# Patient Record
(demographics unavailable — no encounter records)

---

## 2024-12-19 NOTE — PHYSICAL EXAM
[General Appearance - Alert] : alert [General Appearance - In No Acute Distress] : in no acute distress [General Appearance - Well Nourished] : well nourished [General Appearance - Well Developed] : well developed [General Appearance - Well-Appearing] : well appearing [Appearance Of Head] : the head was normocephalic [Facies] : there were no dysmorphic facial features [Sclera] : the conjunctiva were normal [Outer Ear] : the ears and nose were normal in appearance [Examination Of The Oral Cavity] : mucous membranes were moist and pink [Auscultation Breath Sounds / Voice Sounds] : breath sounds clear to auscultation bilaterally [Normal Chest Appearance] : the chest was normal in appearance [Apical Impulse] : quiet precordium with normal apical impulse [Heart Rate And Rhythm] : normal heart rate and rhythm [Heart Sounds] : normal S1 and S2 [Heart Sounds Gallop] : no gallops [Heart Sounds Pericardial Friction Rub] : no pericardial rub [Edema] : no edema [Arterial Pulses] : normal upper and lower extremity pulses with no pulse delay [Heart Sounds Click] : no clicks [Capillary Refill Test] : normal capillary refill [Bowel Sounds] : normal bowel sounds [Abdomen Soft] : soft [Nondistended] : nondistended [Abdomen Tenderness] : non-tender [Nail Clubbing] : no clubbing  or cyanosis of the fingers [Motor Tone] : normal muscle strength and tone [Cervical Lymph Nodes Enlarged Anterior] : The anterior cervical nodes were normal [Cervical Lymph Nodes Enlarged Posterior] : The posterior cervical nodes were normal [Skin Lesions] : no lesions [Skin Turgor] : normal turgor [Demonstrated Behavior - Infant Nonreactive To Parents] : interactive [Mood] : mood and affect were appropriate for age [Demonstrated Behavior] : normal behavior [Systolic] : systolic [II] : a grade 2/6 [LMSB] : LMSB  [Low] : low pitched [Vibratory] : vibratory [Mid] : mid [] : increases when lying on left side Global muscle tone and symmetry mild decreased tone/Global muscle tone and symmetry

## 2024-12-19 NOTE — PHYSICAL EXAM
[General Appearance - Alert] : alert [General Appearance - In No Acute Distress] : in no acute distress [General Appearance - Well Nourished] : well nourished [General Appearance - Well Developed] : well developed [General Appearance - Well-Appearing] : well appearing [Facies] : there were no dysmorphic facial features [Appearance Of Head] : the head was normocephalic [Sclera] : the conjunctiva were normal [Outer Ear] : the ears and nose were normal in appearance [Examination Of The Oral Cavity] : mucous membranes were moist and pink [Auscultation Breath Sounds / Voice Sounds] : breath sounds clear to auscultation bilaterally [Normal Chest Appearance] : the chest was normal in appearance [Apical Impulse] : quiet precordium with normal apical impulse [Heart Rate And Rhythm] : normal heart rate and rhythm [Heart Sounds] : normal S1 and S2 [Heart Sounds Gallop] : no gallops [Heart Sounds Pericardial Friction Rub] : no pericardial rub [Edema] : no edema [Arterial Pulses] : normal upper and lower extremity pulses with no pulse delay [Heart Sounds Click] : no clicks [Capillary Refill Test] : normal capillary refill [Bowel Sounds] : normal bowel sounds [Abdomen Soft] : soft [Nondistended] : nondistended [Abdomen Tenderness] : non-tender [Nail Clubbing] : no clubbing  or cyanosis of the fingers [Motor Tone] : normal muscle strength and tone [Cervical Lymph Nodes Enlarged Anterior] : The anterior cervical nodes were normal [Cervical Lymph Nodes Enlarged Posterior] : The posterior cervical nodes were normal [Skin Lesions] : no lesions [Skin Turgor] : normal turgor [Demonstrated Behavior - Infant Nonreactive To Parents] : interactive [Mood] : mood and affect were appropriate for age [Demonstrated Behavior] : normal behavior [Systolic] : systolic [II] : a grade 2/6 [LMSB] : LMSB  [Low] : low pitched [Vibratory] : vibratory [Mid] : mid [] : increases when lying on left side

## 2024-12-29 NOTE — HISTORY OF PRESENT ILLNESS
[FreeTextEntry1] : DELROY is a 13-year-old male who was referred for cardiology consultation due to palpitations. The palpitations began 2 weeks ago, and since then have been occurring 2 times. He was seen at [facility] ER on the day of second episode while in class on 12/6/2024.  The episodes start suddenly/abruptly, and typically occur while at rest, never during exercise. They last approximately 5-10 minutes and terminate spontaneously. They feel like a fast heart rate, as if running. They have not felt too fast to count. They are not associated with chest pain, shortness of breath, diaphoresis, lightheadedness, or nausea. DELROY has never had syncope. There has been no recent change in activity level, no fatigue, and no difficulty gaining weight or weight loss. He is active after school plays soccer and has had no recent decrease in exercise endurance. He generally has good fluid intake and does not drink excessive caffeinated beverages.  Importantly, there is no family history of premature sudden death, cardiomyopathy, arrhythmia, drowning, or unexplained accidental deaths.

## 2024-12-29 NOTE — HISTORY OF PRESENT ILLNESS
[FreeTextEntry1] : DELROY is a 13-year-old male who was referred for cardiology consultation due to palpitations. The palpitations began 2 weeks ago, and since then have been occurring 2 times. He was seen at [facility] on the day of second episode while in class on 12/6/2024.  The episodes start suddenly/abruptly, and typically occur while at rest, never during exercise. They last approximately 5-10 minutes and terminate spontaneously. They feel like a fast heart rate, as if running. They have not felt too fast to count. They are not associated with chest pain, shortness of breath, diaphoresis, lightheadedness, or nausea. DELROY has never had syncope. There has been no recent change in activity level, no fatigue, and no difficulty gaining weight or weight loss. He is active after school plays soccer and has had no recent decrease in exercise endurance. He generally has good fluid intake and does not drink excessive caffeinated beverages.  Importantly, there is no family history of premature sudden death, cardiomyopathy, arrhythmia, drowning, or unexplained accidental deaths.

## 2024-12-29 NOTE — REASON FOR VISIT
[Initial Evaluation] : an initial evaluation of [Dizziness/Lightheadedness] : dizziness/lightheadedness [Palpitations] : palpitations [Family Member] : family member [[employer] ] : provided by [employer]   [Interpreters_IDNumber] : [id number] [Interpreters_FullName] : Laure Cho

## 2024-12-29 NOTE — DISCUSSION/SUMMARY
[PE + No Restrictions] : [unfilled] may participate in the entire physical education program without restriction, including all varsity competitive sports. [FreeTextEntry1] : In summary, DELROY is a 13-year male with palpitations. I discussed at length with the family that these symptoms are concerning for a possible arrhythmia, and that I would like to investigate further with a Holter monitor (which was placed today). If He feels recurrent symptoms, His heart rate should be checked. Medical attention should be sought immediately if the palpitations are prolonged, associated with lightheadedness, or if there is loss of consciousness.    He may be feeling sinus tachycardia related to inadequate fluid intake, physical activity or anxiety. He should maintain good hydration. He should drink at least 6-8 cups of non-caffeinated beverages per day. Caffeinated beverages should be avoided. Fluid intake should be titrated to keep the urine dilute.   He has trivial aortic regurgitation, that needs to be monitored.  Routine pediatric cardiology follow-up is in 1 year, earlier if the Holter monitor is abnormal, if there are increased palpitations, syncope or any other cardiac concerns.  The family verbalized understanding, and all questions were answered.   Total time spent today included reviewing diagnosis and treatment plan/monitoring, review of prior notes, review of medications and monitoring for side effects, review of last labs with patient/family, plan for continued symptom and laboratory monitoring as ordered, and time spent with patient/parent. Reviewed ECG results, echocardiographic findings, prognosis and follow up plan as detailed in cardiology test results and discussion above. Reviewed recent chart notes from other providers and medical records as well. This excludes time spent on procedures. [Needs SBE Prophylaxis] : [unfilled] does not need bacterial endocarditis prophylaxis

## 2024-12-29 NOTE — CONSULT LETTER
[Today's Date] : [unfilled] [Name] : Name: [unfilled] [] : : ~~ [Today's Date:] : [unfilled] [Dear  ___:] : Dear Dr. [unfilled]: [Consult] : I had the pleasure of evaluating your patient, [unfilled]. My full evaluation follows. [Consult - Single Provider] : Thank you very much for allowing me to participate in the care of this patient. If you have any questions, please do not hesitate to contact me. [Sincerely,] : Sincerely, [FreeTextEntry5] : [address] [FreeTextEntry4] : Henry Suazo MD [FreeTextEntry6] : [place] [address] [de-identified] : Colby Cook MD, FACC, MAHESH, FAAP Pediatric Cardiologist [facility]

## 2024-12-29 NOTE — CARDIOLOGY SUMMARY
[Today's Date] : [unfilled] [FreeTextEntry1] : For palpitations, Normal sinus rhythm, normal QRS axis, normal intervals (QTc 415 msec), no hypertrophy, no pre-excitation, no ST segment or T wave abnormalities. Normal EKG for age.  [FreeTextEntry2] : Trivial aortic regurgitation.  LV dimensions and shortening fraction were normal.  No pericardial effusion. [de-identified] : placed [de-identified] : 11/7/2024 [de-identified] : Normal CBC, CMP and TC

## 2024-12-29 NOTE — CARDIOLOGY SUMMARY
[Today's Date] : [unfilled] [FreeTextEntry1] : For palpitations, Normal sinus rhythm, normal QRS axis, normal intervals (QTc 415 msec), no hypertrophy, no pre-excitation, no ST segment or T wave abnormalities. Normal EKG for age.  [FreeTextEntry2] : Trivial aortic regurgitation.  LV dimensions and shortening fraction were normal.  No pericardial effusion. [de-identified] : placed [de-identified] : 11/7/2024 [de-identified] : Normal CBC, CMP and TC

## 2024-12-29 NOTE — HISTORY OF PRESENT ILLNESS
[FreeTextEntry1] : DELROY is a 13-year-old male who was referred for cardiology consultation due to palpitations. The palpitations began 2 weeks ago, and since then have been occurring 2 times. He was seen at [facility] ER on the day of second episode while in class on 12/6/2024.  The episodes start suddenly/abruptly, and typically occur while at rest, never during exercise. They last approximately 5-10 minutes and terminate spontaneously. They feel like a fast heart rate, as if running. They have not felt too fast to count. They are not associated with chest pain, shortness of breath, diaphoresis, lightheadedness, or nausea. DELROY has never had syncope. There has been no recent change in activity level, no fatigue, and no difficulty gaining weight or weight loss. He is active after school plays soccer and has had no recent decrease in exercise endurance. He generally has good fluid intake and does not drink excessive caffeinated beverages.  Importantly, there is no family history of premature sudden death, cardiomyopathy, arrhythmia, drowning, or unexplained accidental deaths.

## 2024-12-29 NOTE — REASON FOR VISIT
[Initial Evaluation] : an initial evaluation of [Dizziness/Lightheadedness] : dizziness/lightheadedness [Palpitations] : palpitations [Family Member] : family member [[employer] ] : provided by [employer]   [Interpreters_IDNumber] : [id number] [Interpreters_FullName] : Laure Cho

## 2024-12-29 NOTE — CARDIOLOGY SUMMARY
[Today's Date] : [unfilled] [FreeTextEntry1] : For palpitations, Normal sinus rhythm, normal QRS axis, normal intervals (QTc 415 msec), no hypertrophy, no pre-excitation, no ST segment or T wave abnormalities. Normal EKG for age.  [FreeTextEntry2] : Trivial aortic regurgitation.  LV dimensions and shortening fraction were normal.  No pericardial effusion. [de-identified] : placed [de-identified] : 11/7/2024 [de-identified] : Normal CBC, CMP and TC

## 2024-12-29 NOTE — REASON FOR VISIT
[Initial Evaluation] : an initial evaluation of [Dizziness/Lightheadedness] : dizziness/lightheadedness [Palpitations] : palpitations [Family Member] : family member [[employer] ] : provided by [employer]   [Interpreters_IDNumber] : [id number] [Interpreters_FullName] : Laure Cho

## 2024-12-29 NOTE — CONSULT LETTER
[Today's Date] : [unfilled] [Name] : Name: [unfilled] [] : : ~~ [Today's Date:] : [unfilled] [Dear  ___:] : Dear Dr. [unfilled]: [Consult] : I had the pleasure of evaluating your patient, [unfilled]. My full evaluation follows. [Consult - Single Provider] : Thank you very much for allowing me to participate in the care of this patient. If you have any questions, please do not hesitate to contact me. [Sincerely,] : Sincerely, [FreeTextEntry4] : Henry Suazo MD [FreeTextEntry5] : [address] [FreeTextEntry6] : [place] [address] [de-identified] : Colby Cook MD, FACC, MAHESH, FAAP Pediatric Cardiologist [facility]

## 2024-12-29 NOTE — REVIEW OF SYSTEMS
[Dizziness] : dizziness [Palpitations] : palpitations [Feeling Poorly] : not feeling poorly (malaise) [Fever] : no fever [Wgt Loss (___ Lbs)] : no recent weight loss [Pallor] : not pale [Eye Discharge] : no eye discharge [Redness] : no redness [Change in Vision] : no change in vision [Nasal Stuffiness] : no nasal congestion [Sore Throat] : no sore throat [Earache] : no earache [Loss Of Hearing] : no hearing loss [Cyanosis] : no cyanosis [Edema] : no edema [Diaphoresis] : not diaphoretic [Chest Pain] : no chest pain or discomfort [Exercise Intolerance] : no persistence of exercise intolerance [Orthopnea] : no orthopnea [Fast HR] : no tachycardia [Tachypnea] : not tachypneic [Wheezing] : no wheezing [Cough] : no cough [Shortness Of Breath] : not expressed as feeling short of breath [Vomiting] : no vomiting [Diarrhea] : no diarrhea [Abdominal Pain] : no abdominal pain [Decrease In Appetite] : appetite not decreased [Fainting (Syncope)] : no fainting [Seizure] : no seizures [Headache] : no headache [Limping] : no limping [Joint Pains] : no arthralgias [Joint Swelling] : no joint swelling [Rash] : no rash [Wound problems] : no wound problems [Easy Bruising] : no tendency for easy bruising [Swollen Glands] : no lymphadenopathy [Easy Bleeding] : no ~M tendency for easy bleeding [Nosebleeds] : no epistaxis [Sleep Disturbances] : ~T no sleep disturbances [Hyperactive] : no hyperactive behavior [Depression] : no depression [Anxiety] : no anxiety [Failure To Thrive] : no failure to thrive [Short Stature] : short stature was not noted [Jitteriness] : no jitteriness [Heat/Cold Intolerance] : no temperature intolerance [Dec Urine Output] : no oliguria

## 2024-12-29 NOTE — CONSULT LETTER
[Today's Date] : [unfilled] [Name] : Name: [unfilled] [] : : ~~ [Today's Date:] : [unfilled] [Dear  ___:] : Dear Dr. [unfilled]: [Consult] : I had the pleasure of evaluating your patient, [unfilled]. My full evaluation follows. [Consult - Single Provider] : Thank you very much for allowing me to participate in the care of this patient. If you have any questions, please do not hesitate to contact me. [Sincerely,] : Sincerely, [FreeTextEntry4] : Henry Suazo MD [FreeTextEntry5] : [address] [FreeTextEntry6] : [place] [address] [de-identified] : Colby Cook MD, FACC, MAHEHS, FAAP Pediatric Cardiologist [facility]